# Patient Record
Sex: FEMALE | Race: WHITE
[De-identification: names, ages, dates, MRNs, and addresses within clinical notes are randomized per-mention and may not be internally consistent; named-entity substitution may affect disease eponyms.]

---

## 2018-04-11 ENCOUNTER — HOSPITAL ENCOUNTER (EMERGENCY)
Dept: HOSPITAL 52 - LL.ED | Age: 23
Discharge: HOME | End: 2018-04-11
Payer: COMMERCIAL

## 2018-04-11 DIAGNOSIS — K02.9: Primary | ICD-10-CM

## 2018-04-11 DIAGNOSIS — F17.210: ICD-10-CM

## 2018-04-11 NOTE — EDM.PDOC
ED HPI GENERAL MEDICAL PROBLEM





- General


Chief Complaint: ENT Problem


Stated Complaint: Left tooth/jaw pain


Time Seen by Provider: 04/11/18 03:03


Source of Information: Reports: Patient


History Limitations: Reports: No Limitations





- History of Present Illness


INITIAL COMMENTS - FREE TEXT/NARRATIVE: 





Patient is a 23-year-old female who presented to the ER with left lower jaw 

pain and upper jaw pain patient has multiple cavities and fractured teeth that 

were seen by myself she has been seen at the dentist and was referred to an 

oral surgeon for extractions at this time I feel that this is long-term problem 

which needs to be addressed by dentistry or oral surgeon.


Onset: Other (Long-term)


Duration: Chronic, Getting Worse


Location: Reports: Face


Quality: Reports: Ache, Throbbing


Severity: Moderate


Improves with: Reports: None


Worsens with: Reports: Cold Therapy, Eating


Context: Reports: Other (Chronic dental problems)


Associated Symptoms: Reports: Fever/Chills


Treatments PTA: Reports: NSAIDS, Other Medication(s) (Excedrin)


  ** Left Oral/Mouth


Pain Score (Numeric/FACES): 10





- Related Data


 Allergies











Allergy/AdvReac Type Severity Reaction Status Date / Time


 


No Known Allergies Allergy   Verified 04/11/18 02:49











Home Meds: 


 Home Meds





Ibuprofen 400 mg PO Q6H PRN 04/11/18 [History]











Past Medical History





- Past Surgical History


HEENT Surgical History: Reports: Oral Surgery, Other (See Below)


Other HEENT Surgeries/Procedures: Teeth removal





Social & Family History





- Tobacco Use


Smoking Status *Q: Current Every Day Smoker


Years of Tobacco use: 8


Packs/Tins Daily: 0.5





- Caffeine Use


Caffeine Use: Reports: None





- Recreational Drug Use


Recreational Drug Use: Yes


Drug Use in Last 12 Months: Yes





ED ROS ENT





- Review of Systems


Review Of Systems: See Below


Constitutional: Reports: Fever, Chills, Decreased Appetite (Secondary to pain)


HEENT: Reports: Dental Pain


Respiratory: Reports: No Symptoms


Cardiovascular: Reports: No Symptoms


Endocrine: Reports: No Symptoms


GI/Abdominal: Reports: No Symptoms


: Reports: No Symptoms


Musculoskeletal: Reports: No Symptoms


Skin: Reports: No Symptoms


Neurological: Reports: No Symptoms


Psychiatric: Reports: No Symptoms


Hematologic/Lymphatic: Reports: No Symptoms


Immunologic: Reports: No Symptoms





ED EXAM, ENT





- Physical Exam


Exam: See Below


Exam Limited By: No Limitations


General Appearance: Alert, WD/WN, No Apparent Distress


Ears: Normal External Exam, Normal Canal, Hearing Grossly Normal, Normal TMs


Nose: Normal Inspection, Normal Mucousa, No Blood


Mouth/Throat: Dental Pain, Dental Tenderness.  No: Normal Teeth


Head: Atraumatic, Normocephalic


Neck: Normal Inspection, Supple, Non-Tender, Full Range of Motion


Respiratory/Chest: No Respiratory Distress, Lungs Clear, Normal Breath Sounds, 

No Accessory Muscle Use, Chest Non-Tender


Cardiovascular: Normal Peripheral Pulses, Regular Rate, Rhythm, No Edema, No 

Gallop, No JVD, No Murmur, No Rub


GI/Abdominal: Normal Bowel Sounds, Soft, Non-Tender, No Organomegaly, No 

Distention, No Abnormal Bruit, No Mass


 (Female) Exam: Deferred


Rectal (Female) Exam: Deferred


Back: Normal Inspection, Full Range of Motion


Extremities: Normal Inspection, Normal Range of Motion, Non-Tender, No Pedal 

Edema, Normal Capillary Refill


Neurological: Alert, Oriented, CN II-XII Intact, Normal Cognition, Normal Gait, 

Normal Reflexes, No Motor/Sensory Deficits


Psychiatric: Normal Affect, Normal Mood


Skin: Warm, Dry, Intact, Normal Color, No Rash


Lymphatic: No Adenopathy





Course





- Vital Signs


Last Recorded V/S: 





 Last Vital Signs











Temp  98.3 F   04/11/18 03:02


 


Pulse  83   04/11/18 03:02


 


Resp  16   04/11/18 03:02


 


BP  125/89   04/11/18 03:02


 


Pulse Ox  97   04/11/18 03:02














Departure





- Departure


Time of Disposition: 03:24


Disposition: Home, Self-Care 01


Clinical Impression: 


 Dental caries





Fracture of tooth


Qualifiers:


 Encounter type: initial encounter Fracture type: open Qualified Code(s): 

S02.5XXB - Fracture of tooth (traumatic), initial encounter for open fracture








- Discharge Information


Referrals: 


PCP,None [Primary Care Provider] - 


Care Plan Goals: 


Patient will be placed on Augmentin 875 twice a day plus Toradol 10 mg every 6 

hours for pain control patient needs to be referred by the dentist to oral 

surgeon